# Patient Record
(demographics unavailable — no encounter records)

---

## 2025-01-28 NOTE — PAST MEDICAL HISTORY
IF symptoms do not improve okay to see GI. Her CT is stable, she has known cysts.
Pt in ED on 2/18 for RUQ pain and diarrhea. CT Abd/ Pelvis: 1. There is no discrete evidence of an acute intra-abdominal or pelvic process. 2. Multiple cysts of the liver, kidneys and large subcapsular cyst of the spleen as described above. All the cysts appear simple in nature/nonenhancing and require no further workup. 3. Bilateral low-density adrenal adenomas. Pt asks if she should see GI. LOV 2/1/23 Please advise.
Pt states diarrhea has subsided and just a twinge. Pt states she will schedule OV after her procedure on 3/2/23.
Pt was in ER 02/18 and is wondering if she should have an appt w/ gastroenterologist     LOV:02/01/23
[TextEntry] : None.

## 2025-01-28 NOTE — DISCUSSION/SUMMARY
[FreeTextEntry1] : In summary, JOANNE is a 14 year old female here for chest pain. Her physical exam is normal. Her EKG shows sinus rhythm, and her echocardiogram shows normal intracardiac anatomy with good biventricular systolic function and no effusion. Given these results and her clinical presentation, I provided reassurance and explained that JOANNE has a structurally normal heart. No further cardiac work up or follow up is necessary at this time. However, I would recommend re-evaluation if there are any new or worsening symptoms in the future.   Plan: - Referral to neurology for headaches, chest pains. - Return as needed for any new and/or worsening symptoms. - No activity restrictions. - No SBE prophylaxis.     Please do not hesitate to contact me if you have any questions.   Jason Esteves MD, MS, FAAP, FACC Attending Physician, Pediatric Cardiology [facility] [address] [place] [address], [address] [place] [address] Office: [phone] Fax: [phone] Email: [email or web address]     I have spent 50 minutes of time on the encounter excluding separately reported services.

## 2025-01-28 NOTE — HISTORY OF PRESENT ILLNESS
[FreeTextEntry1] : Dear Dr. ELOY COELHO,   I had the pleasure of seeing your patient, ROBERT STRONG, in my office today, 01/28/2025. As you know, she is a 14 year old female referred to pediatric cardiology due to chest pain.   Robert has been having intermittent chest pains over the past few weeks.  She describes a burning and sometimes sharp pain that occurs throughout her chest.  It does not consistently occur in the same spot.  There are no clear triggers or alleviating factors.  She occasionally wakes up with the pain.  The pain initially lasted 20 minutes per episode, but more recently episodes have been approximately 5 minutes or less.  Her twin sister had similar pains a few months ago which spontaneously resolved. There is no history of palpitations, SOB, vision changes, dizziness, or syncope. +Headaches. No fevers or URI symptoms. No family history of congenital heart disease or sudden/unexplained death. No family member with a known genetic syndrome.

## 2025-05-16 NOTE — REVIEW OF SYSTEMS
Identified patient with two patient identifiers (name and ). Reviewed chart in preparation for visit and have obtained necessary documentation.    Miguel Carroll is a 48 y.o. male  Chief Complaint   Patient presents with    Weight Management     [facility]     Ht 1.854 m (6' 1\")   Wt 132 kg (291 lb)   BMI 38.39 kg/m²     1. Have you been to the ER, urgent care clinic since your last visit?  Hospitalized since your last visit?no    2. Have you seen or consulted any other health care providers outside of the [facility] since your last visit?  Include any pap smears or colon screening. no   [Normal] : Psychiatric [FreeTextEntry3] : Wears eye glasses [FreeTextEntry7] : Skips breakfast. Drinks good amount of water [FreeTextEntry8] : Sleeps well overnight

## 2025-05-16 NOTE — ASSESSMENT
[FreeTextEntry1] : 13 yo with chronic headaches. Likely contributors are poor nutrition and overuse of OTC medication. Physical exam is nonfocal.  Discussed methods to improve nutrition Suggested start of Topiramate but she wants to try improving nutrition first Reassess in one month

## 2025-05-16 NOTE — HISTORY OF PRESENT ILLNESS
[FreeTextEntry1] : Robert presents for evaluation of headaches that have been occurring for the last 5 months. She describes holocranial throbbing pain and pain behind the eyes. No associated vision/hearing changes, nausea/vomiting, weakness or other sensory disturbances. headaches are not triggered by any activity and do not interfere with sleep. Headaches occur up to four times weekly and she has been taking Excedrin Migraine for every headache. There are days in between when she reports no pain.

## 2025-05-16 NOTE — PHYSICAL EXAM
[facility]  PHYSICAL THERAPY MISSED TREATMENT NOTE  ACUTE CARE  STRZ CCU-STEPDOWN 3B              Missed Treatment  OT saw pt and he is moving at baseline other than endurance. Pt moving with Mod independence /S. Will defer PT. Pt can ambulate with [facility] staff. [Well-appearing] : well-appearing [Normocephalic] : normocephalic [No dysmorphic facial features] : no dysmorphic facial features [No ocular abnormalities] : no ocular abnormalities [Neck supple] : neck supple [Lungs clear] : lungs clear [Heart sounds regular in rate and rhythm] : heart sounds regular in rate and rhythm [Soft] : soft [No abnormal neurocutaneous stigmata or skin lesions] : no abnormal neurocutaneous stigmata or skin lesions [Straight] : straight [No deformities] : no deformities [Alert] : alert [Well related, good eye contact] : well related, good eye contact [Conversant] : conversant [Normal speech and language] : normal speech and language [Follows instructions well] : follows instructions well [VFF] : VFF [Pupils reactive to light and accommodation] : pupils reactive to light and accommodation [Full extraocular movements] : full extraocular movements [Saccadic and smooth pursuits intact] : saccadic and smooth pursuits intact [No nystagmus] : no nystagmus [No papilledema] : no papilledema [Normal facial sensation to light touch] : normal facial sensation to light touch [No facial asymmetry or weakness] : no facial asymmetry or weakness [Gross hearing intact] : gross hearing intact [Equal palate elevation] : equal palate elevation [Good shoulder shrug] : good shoulder shrug [Normal tongue movement] : normal tongue movement [Midline tongue, no fasciculations] : midline tongue, no fasciculations [Normal axial and appendicular muscle tone] : normal axial and appendicular muscle tone [Gets up on table without difficulty] : gets up on table without difficulty [No pronator drift] : no pronator drift [Normal finger tapping and fine finger movements] : normal finger tapping and fine finger movements [No abnormal involuntary movements] : no abnormal involuntary movements [5/5 strength in proximal and distal muscles of arms and legs] : 5/5 strength in proximal and distal muscles of arms and legs [Walks and runs well] : walks and runs well [Able to walk on heels] : able to walk on heels [Able to walk on toes] : able to walk on toes [2+ biceps] : 2+ biceps [Triceps] : triceps [Knee jerks] : knee jerks [Localizes LT and temperature] : localizes LT and temperature [Good walking balance] : good walking balance [Normal gait] : normal gait [de-identified] : Oral braces

## 2025-05-16 NOTE — BIRTH HISTORY
[At ___ Weeks Gestation] : at [unfilled] weeks gestation [place] [None] : there were no delivery complications [Age Appropriate] : age appropriate developmental milestones met [de-identified] : Twin "B"